# Patient Record
Sex: MALE | Race: WHITE | NOT HISPANIC OR LATINO
[De-identification: names, ages, dates, MRNs, and addresses within clinical notes are randomized per-mention and may not be internally consistent; named-entity substitution may affect disease eponyms.]

---

## 2019-04-02 PROBLEM — Z00.00 ENCOUNTER FOR PREVENTIVE HEALTH EXAMINATION: Status: ACTIVE | Noted: 2019-04-02

## 2019-04-03 ENCOUNTER — APPOINTMENT (OUTPATIENT)
Dept: PHYSICAL MEDICINE AND REHAB | Facility: CLINIC | Age: 39
End: 2019-04-03
Payer: COMMERCIAL

## 2019-04-03 ENCOUNTER — RECORD ABSTRACTING (OUTPATIENT)
Age: 39
End: 2019-04-03

## 2019-04-03 VITALS — BODY MASS INDEX: 23.59 KG/M2 | HEIGHT: 73 IN | WEIGHT: 178 LBS

## 2019-04-03 DIAGNOSIS — M19.019 PRIMARY OSTEOARTHRITIS, UNSPECIFIED SHOULDER: ICD-10-CM

## 2019-04-03 DIAGNOSIS — S43.419A: ICD-10-CM

## 2019-04-03 DIAGNOSIS — M54.2 CERVICALGIA: ICD-10-CM

## 2019-04-03 DIAGNOSIS — S43.50XA SPRAIN OF UNSPECIFIED ACROMIOCLAVICULAR JOINT, INITIAL ENCOUNTER: ICD-10-CM

## 2019-04-03 DIAGNOSIS — Z82.61 FAMILY HISTORY OF ARTHRITIS: ICD-10-CM

## 2019-04-03 DIAGNOSIS — M23.305 OTHER MENISCUS DERANGEMENTS, UNSPECIFIED MEDIAL MENISCUS, UNSPECIFIED KNEE: ICD-10-CM

## 2019-04-03 PROCEDURE — 99204 OFFICE O/P NEW MOD 45 MIN: CPT | Mod: 25

## 2019-04-03 PROCEDURE — 20552 NJX 1/MLT TRIGGER POINT 1/2: CPT

## 2019-04-03 RX ORDER — EZETIMIBE 10 MG/1
TABLET ORAL
Refills: 0 | Status: ACTIVE | COMMUNITY

## 2019-04-03 RX ORDER — CYCLOBENZAPRINE HYDROCHLORIDE 10 MG/1
10 TABLET, FILM COATED ORAL
Refills: 0 | Status: DISCONTINUED | COMMUNITY
End: 2019-04-03

## 2019-04-03 RX ORDER — DICLOFENAC SODIUM 75 MG/1
75 TABLET, DELAYED RELEASE ORAL TWICE DAILY
Qty: 60 | Refills: 0 | Status: ACTIVE | COMMUNITY
Start: 2019-04-03 | End: 1900-01-01

## 2019-04-03 RX ORDER — ROSUVASTATIN CALCIUM 5 MG/1
TABLET, FILM COATED ORAL
Refills: 0 | Status: ACTIVE | COMMUNITY

## 2019-04-03 RX ORDER — MELOXICAM 15 MG/1
15 TABLET ORAL
Refills: 0 | Status: DISCONTINUED | COMMUNITY
End: 2019-04-03

## 2019-04-03 RX ORDER — IBUPROFEN 200 MG/1
TABLET, COATED ORAL
Refills: 0 | Status: ACTIVE | COMMUNITY

## 2019-04-03 NOTE — HISTORY OF PRESENT ILLNESS
[FreeTextEntry1] : Location: left neck\par Quality: sharp\par Severity: moderate\par Duration: 1 yr\par Timing: chronic\par Context: working out; lifting weight from rack \par Aggravating Factors: lifting weights\par Alleviating Factors: rest\par Associated Symptoms: denies weight loss, fever, chills, change in bowel/bladder habits, redness, warmth, weakness, numbness/tingling, radiation down \par Prior Studies:\par

## 2019-04-03 NOTE — ASSESSMENT
[FreeTextEntry1] : NO heavy lifting for 2-3 wk.  Stretch neck more.  Roll out upper back against wall.  He had to leave soon so did not get xrays.

## 2019-04-03 NOTE — PROCEDURE
[de-identified] : After informed consent,he elected to proceed with a trigger point injection into the left trapezius. I confirmed no prior adverse reactions, no active infections, and no relevant allergies. \par  \par The skin was prepped in the usual sterile manner. The muscles were pinched and elevated from the chest wall to avoid puncturing the lung. The sites were injected with local anesthetic followed by local needling. The injection was completed without complication and a bandage was applied. He tolerated the procedure well and was given post-injection instructions. \par  \par Cold Tx x 48 hours, analgesics prn. Medications: 0.5 ml of 1% Lidocaine per site\par \par

## 2019-04-03 NOTE — PHYSICAL EXAM
[FreeTextEntry1] : PERLA is a 39 year  yr old male \par \par Constitutional: healthy appearing, NAD, and normal body habitus\par \par NECK\par ROM: flexion to 30, ext to 30, rotation to 70 deg bilat\par \par Inspection: no erythema, warmth\par Spine: no TTP in spinous process\par Soft tissue palpation: no TTP in cervical paraspinals, rhomboids,TTP in left trapezius\par \par 5/5 bilateral elb flex/ext, WE, finger abd/flex\par sensation intact in bilat UE\par reflexes:  biceps and triceps 1+ bilat\par \par Special tests: neg Spurling, Strauss\par \par

## 2019-04-04 ENCOUNTER — APPOINTMENT (OUTPATIENT)
Dept: PHYSICAL MEDICINE AND REHAB | Facility: CLINIC | Age: 39
End: 2019-04-04
Payer: COMMERCIAL

## 2019-04-04 VITALS — WEIGHT: 178 LBS | BODY MASS INDEX: 23.59 KG/M2 | HEIGHT: 73 IN

## 2019-04-04 DIAGNOSIS — Z80.9 FAMILY HISTORY OF MALIGNANT NEOPLASM, UNSPECIFIED: ICD-10-CM

## 2019-04-04 PROCEDURE — 72040 X-RAY EXAM NECK SPINE 2-3 VW: CPT

## 2019-04-04 PROCEDURE — 99214 OFFICE O/P EST MOD 30 MIN: CPT | Mod: 25

## 2019-04-04 PROCEDURE — 20552 NJX 1/MLT TRIGGER POINT 1/2: CPT

## 2019-04-04 NOTE — HISTORY OF PRESENT ILLNESS
[FreeTextEntry1] : Location: left neck\par Quality: sharp\par Severity: moderate\par Duration: 1 yr\par Timing: chronic\par Context: working out; lifting weight from rack \par Aggravating Factors: lifting weights\par Alleviating Factors: rest\par Associated Symptoms: denies weight loss, fever, chills, change in bowel/bladder habits, redness, warmth, weakness, numbness/tingling, radiation down \par Prior Studies: none\par

## 2019-04-04 NOTE — PROCEDURE
[de-identified] : \par \par \par 0.2 x 30 mm sterile solid steel needles were inserted into Du 14, and left trapezius in multiple areas and manipulated intermittently over the following 15 minutes. The needles were then removed. Hemostasis was achieved immediately. He  tolerated the procedure well and was given discharge instructions and then discharged to home without any complaints or complications.\par \par

## 2019-04-04 NOTE — PHYSICAL EXAM
[FreeTextEntry1] : PERLA is a 39 year yr old male \par \par Constitutional: healthy appearing, NAD, and normal body habitus\par \par NECK\par ROM: flexion to 30, ext to 30, rotation to 70 deg bilat\par \par Inspection: no erythema, warmth\par Spine: no TTP in spinous process\par Soft tissue palpation: no TTP in cervical paraspinals, rhomboids,TTP in left trapezius\par \par 5/5 bilateral elb flex/ext, WE, finger abd/flex\par sensation intact in bilat UE\par reflexes: biceps and triceps 1+ bilat\par \par Special tests: neg Spurling, Strauss\par

## 2019-04-04 NOTE — ASSESSMENT
[FreeTextEntry1] : Adjust laptop setup.  Limit cell phone use and raise it to eye level when using it.

## 2019-04-09 ENCOUNTER — APPOINTMENT (OUTPATIENT)
Dept: PHYSICAL MEDICINE AND REHAB | Facility: CLINIC | Age: 39
End: 2019-04-09
Payer: COMMERCIAL

## 2019-04-09 VITALS — BODY MASS INDEX: 23.59 KG/M2 | HEIGHT: 73 IN | WEIGHT: 178 LBS

## 2019-04-09 PROCEDURE — 20552 NJX 1/MLT TRIGGER POINT 1/2: CPT

## 2019-04-09 PROCEDURE — 99213 OFFICE O/P EST LOW 20 MIN: CPT | Mod: 25

## 2019-04-09 NOTE — PHYSICAL EXAM
[FreeTextEntry1] : PERLA is a 39 year yr old male \par \par Constitutional: healthy appearing, NAD, and normal body habitus\par \par NECK\par ROM: flexion to 30, ext to 30, rotation to 70 deg bilat\par \par Inspection: no erythema, warmth\par Spine: no TTP in spinous process\par Soft tissue palpation: no TTP in cervical paraspinals, rhomboids,TTP in left trapezius\par \par 5/5 bilateral elb flex/ext, WE, finger abd/flex\par sensation intact in bilat UE\par reflexes: biceps and triceps 1+ bilat\par \par Special tests: neg Spurling, Strauss\par . \par

## 2019-04-09 NOTE — PROCEDURE
[de-identified] : \par \par \par \par 0.2 x 30 mm sterile solid steel needles were inserted into Du 14, and left trapezius in multiple areas and manipulated intermittently over the following 15 minutes. The needles were then removed. Hemostasis was achieved immediately. He  tolerated the procedure well and was given discharge instructions and then discharged to home without any complaints or complications.\par \par

## 2019-04-09 NOTE — HISTORY OF PRESENT ILLNESS
[FreeTextEntry1] : Location: left neck\par Quality: sharp\par Severity: mild, improving\par Duration: 1 yr\par Timing: chronic\par Context: working out; lifting weight from rack \par Aggravating Factors: lifting weights\par Alleviating Factors: rest\par Associated Symptoms: denies weight loss, fever, chills, change in bowel/bladder habits, redness, warmth, weakness, numbness/tingling, radiation down \par Prior Studies: xray\par

## 2019-04-16 ENCOUNTER — APPOINTMENT (OUTPATIENT)
Dept: PHYSICAL MEDICINE AND REHAB | Facility: CLINIC | Age: 39
End: 2019-04-16
Payer: COMMERCIAL

## 2019-04-16 PROCEDURE — 99213 OFFICE O/P EST LOW 20 MIN: CPT | Mod: 25

## 2019-04-16 PROCEDURE — 20552 NJX 1/MLT TRIGGER POINT 1/2: CPT

## 2019-04-16 NOTE — PHYSICAL EXAM
[FreeTextEntry1] : PERLA is a 39 year yr old male \par \par Constitutional: healthy appearing, NAD, and normal body habitus\par \par NECK\par ROM: flexion to 30, ext to 30, rotation to 70 deg bilat\par \par Inspection: no erythema, warmth\par Spine: no TTP in spinous process\par Soft tissue palpation: no TTP in cervical paraspinals, rhomboids,TTP in left trapezius\par \par 5/5 bilateral elb flex/ext, WE, finger abd/flex\par sensation intact in bilat UE\par reflexes: biceps and triceps 1+ bilat\par \par \par

## 2019-04-16 NOTE — HISTORY OF PRESENT ILLNESS
[FreeTextEntry1] : Location: left neck\par Quality: sharp\par Severity: mild, improving\par Duration: 1 yr\par Timing: chronic\par Context: working out; lifting weight from rack \par Aggravating Factors: lifting weights\par Alleviating Factors: rest, TP tx\par Associated Symptoms: denies weight loss, fever, chills, change in bowel/bladder habits, redness, warmth, weakness, numbness/tingling, radiation down \par Prior Studies: xray\par

## 2019-04-16 NOTE — PROCEDURE
[de-identified] : \par \par \par \par 0.2 x 30 mm sterile solid steel needles were inserted into Du 14, and left trapezius in multiple areas and manipulated intermittently over the following 15 minutes. The needles were then removed. Hemostasis was achieved immediately. He  tolerated the procedure well and was given discharge instructions and then discharged to home without any complaints or complications.\par \par

## 2019-04-24 ENCOUNTER — APPOINTMENT (OUTPATIENT)
Dept: PHYSICAL MEDICINE AND REHAB | Facility: CLINIC | Age: 39
End: 2019-04-24
Payer: COMMERCIAL

## 2019-04-24 VITALS — HEIGHT: 73 IN | BODY MASS INDEX: 23.59 KG/M2 | WEIGHT: 178 LBS

## 2019-04-24 PROCEDURE — 99213 OFFICE O/P EST LOW 20 MIN: CPT | Mod: 25

## 2019-04-24 PROCEDURE — 20552 NJX 1/MLT TRIGGER POINT 1/2: CPT

## 2019-04-24 NOTE — PROCEDURE
[de-identified] : \par \par \par 0.2 x 30 mm sterile solid steel needles were inserted into  Du 14, and left trapezius in multiple areas and manipulated intermittently over the following 15 minutes. The needles were then removed. Hemostasis was achieved immediately. He  tolerated the procedure well and was given discharge instructions and then discharged to home without any complaints or complications.\par \par

## 2019-04-24 NOTE — PHYSICAL EXAM
[FreeTextEntry1] : PERLA is a 39 year yr old male \par \par Constitutional: healthy appearing, NAD, and normal body habitus\par \par NECK\par ROM: flexion to 30, ext to 30, rotation to 70 deg bilat\par \par Inspection: no erythema, warmth\par Spine: no TTP in spinous process\par Soft tissue palpation: no TTP in cervical paraspinals, rhomboids, mild TTP in left trapezius\par \par 5/5 bilateral elb flex/ext, WE, finger abd/flex\par sensation intact in bilat UE\par reflexes: biceps and triceps 1+ bilat\par \par \par

## 2019-04-30 ENCOUNTER — APPOINTMENT (OUTPATIENT)
Dept: PHYSICAL MEDICINE AND REHAB | Facility: CLINIC | Age: 39
End: 2019-04-30
Payer: COMMERCIAL

## 2019-04-30 VITALS — WEIGHT: 178 LBS | HEIGHT: 73 IN | BODY MASS INDEX: 23.59 KG/M2

## 2019-04-30 PROCEDURE — 20552 NJX 1/MLT TRIGGER POINT 1/2: CPT

## 2019-04-30 PROCEDURE — 99213 OFFICE O/P EST LOW 20 MIN: CPT | Mod: 25

## 2019-04-30 NOTE — ASSESSMENT
[FreeTextEntry1] : MRI is medically necessary to further evaluation the condition.  \par \par He  has recently tried the following treatments:\par Activity modification      +\par Ice/Compression           +\par Nsaids                           +\par Home exercise             +\par Trigger point treatment  +\par \par Try ma roller. \par

## 2019-04-30 NOTE — PROCEDURE
[de-identified] : \par 0.2 x 30 mm sterile solid steel needles were inserted into Du 14, and cervical paraspinals bilaterally and manipulated intermittently over the following 15 minutes. The needles were then removed. Hemostasis was achieved immediately. He  tolerated the procedure well and was given discharge instructions and then discharged to home without any complaints or complications.\par \par

## 2019-04-30 NOTE — PHYSICAL EXAM
[FreeTextEntry1] : PERLA is a 39 year yr old male \par \par Constitutional: healthy appearing, NAD, and normal body habitus\par \par NECK\par ROM: flexion to 30, ext to 30, rotation to 80 deg bilat\par \par Inspection: no erythema, warmth\par Spine: no TTP in spinous process\par Soft tissue palpation:  TTP in cervical paraspinals, no TTP in rhomboids, mild TTP in left trapezius\par \par 5/5 bilateral elb flex/ext, WE, finger abd/flex\par sensation intact in bilat UE\par reflexes: biceps and triceps 1+ bilat\par \par

## 2019-04-30 NOTE — HISTORY OF PRESENT ILLNESS
[FreeTextEntry1] : Location: neck\par Quality: sharp\par Severity: moderate, worse after doing side to side pullups\par Duration: 1 yr\par Timing: chronic\par Context: working out; lifting weight from rack \par Aggravating Factors: lifting weights\par Alleviating Factors: rest, TP tx\par Associated Symptoms: denies weight loss, fever, chills, change in bowel/bladder habits, redness, warmth, weakness, numbness/tingling, radiation down \par Prior Studies: xray\par

## 2019-05-07 ENCOUNTER — APPOINTMENT (OUTPATIENT)
Dept: PHYSICAL MEDICINE AND REHAB | Facility: CLINIC | Age: 39
End: 2019-05-07
Payer: COMMERCIAL

## 2019-05-07 VITALS — WEIGHT: 178 LBS | BODY MASS INDEX: 23.59 KG/M2 | HEIGHT: 73 IN

## 2019-05-07 VITALS — BODY MASS INDEX: 23.99 KG/M2 | HEIGHT: 73 IN | WEIGHT: 181 LBS

## 2019-05-07 PROCEDURE — 99213 OFFICE O/P EST LOW 20 MIN: CPT | Mod: 25

## 2019-05-07 PROCEDURE — 20552 NJX 1/MLT TRIGGER POINT 1/2: CPT

## 2019-05-07 RX ORDER — MELOXICAM 7.5 MG/1
7.5 TABLET ORAL TWICE DAILY
Qty: 60 | Refills: 0 | Status: DISCONTINUED | COMMUNITY
Start: 2019-04-04 | End: 2019-05-07

## 2019-05-07 NOTE — HISTORY OF PRESENT ILLNESS
[FreeTextEntry1] : Location: neck\par Quality: sharp\par Severity: mild\par Duration: 1 yr\par Timing: chronic\par Context: working out; lifting weight from rack \par Aggravating Factors: lifting weights\par Alleviating Factors: rest, TP tx\par Associated Symptoms: denies weight loss, fever, chills, change in bowel/bladder habits, redness, warmth, weakness, numbness/tingling, radiation down \par Prior Studies: xray\par

## 2019-05-07 NOTE — PROCEDURE
[de-identified] : \par \par 0.2 x 30 mm sterile solid steel needles were inserted into  Du 14, and trapezius, cervical paraspinals bilaterally and manipulated intermittently over the following 15 minutes. The needles were then removed. Hemostasis was achieved immediately. He  tolerated the procedure well and was given discharge instructions and then discharged to home without any complaints or complications.\par \par

## 2019-05-07 NOTE — PHYSICAL EXAM
[FreeTextEntry1] : PERLA is a 39 year yr old male \par \par Constitutional: healthy appearing, NAD, and normal body habitus\par \par NECK\par ROM: flexion to 30, ext to 30, rotation to 80 deg bilat\par \par Inspection: no erythema, warmth\par Spine: no TTP in spinous process\par Soft tissue palpation: TTP in cervical paraspinals, no TTP in rhomboids, mild TTP in left trapezius\par \par 5/5 bilateral elb flex/ext, WE, finger abd/flex\par sensation intact in bilat UE\par reflexes: biceps and triceps 1+ bilat\par \par . \par

## 2019-05-14 ENCOUNTER — APPOINTMENT (OUTPATIENT)
Dept: PHYSICAL MEDICINE AND REHAB | Facility: CLINIC | Age: 39
End: 2019-05-14
Payer: COMMERCIAL

## 2019-05-14 VITALS — HEIGHT: 73 IN | WEIGHT: 181 LBS | BODY MASS INDEX: 23.99 KG/M2

## 2019-05-14 PROCEDURE — 99213 OFFICE O/P EST LOW 20 MIN: CPT | Mod: 25

## 2019-05-14 PROCEDURE — 20552 NJX 1/MLT TRIGGER POINT 1/2: CPT

## 2019-05-14 NOTE — PROCEDURE
[de-identified] : \par \par 0.2 x 30 mm sterile solid steel needles were inserted into Ub 25 to 26 bilateral  Du 14, and left trapezius and manipulated intermittently over the following 15 minutes. The needles were then removed. Hemostasis was achieved immediately. He  tolerated the procedure well and was given discharge instructions and then discharged to home without any complaints or complications.\par \par

## 2019-05-14 NOTE — HISTORY OF PRESENT ILLNESS
[FreeTextEntry1] : Location: neck\par Quality: sharp\par Severity: no pain in past day\par Duration: 1 yr\par Timing: chronic\par Context: working out; lifting weight from rack \par Aggravating Factors: lifting weights\par Alleviating Factors: rest, TP tx\par Associated Symptoms: denies weight loss, fever, chills, change in bowel/bladder habits, redness, warmth, weakness, numbness/tingling, radiation down \par Prior Studies: xray, MRI\par \par Low back hurting after helping his dad set up new bed.  NO pain down legs, numbness.

## 2019-05-14 NOTE — PHYSICAL EXAM
[FreeTextEntry1] : PERLA is a 39 year yr old male \par \par Constitutional: healthy appearing, NAD, and normal body habitus\par \par NECK\par ROM: flexion to 30, ext to 30, rotation to 80 deg bilat\par \par Inspection: no erythema, warmth\par Spine: no TTP in spinous process\par Soft tissue palpation: TTP in cervical paraspinals, no TTP in rhomboids, mild TTP in left trapezius\par \par 5/5 bilateral elb flex/ext, WE, finger abd/flex\par sensation intact in bilat UE\par reflexes: biceps and triceps 1+ bilat\par \par \par \par LUMBAR\par ROM: flexion to 30 deg, ext normal\par \par Gait: normal\par \par Inspection: no erythema, warmth\par Spine: no TTP in spinous process, SI joint, sacrum\par \par Soft tissue palpation hip: no TTP in gluteus kasandra\par Soft tissue palpation of spine: no TTP in lumbar paraspinals\par \par 5/5 bilateral HF, KE, DF, PF \par sensation intact in bilat LE\par \par \par \par \par

## 2020-01-13 ENCOUNTER — APPOINTMENT (OUTPATIENT)
Dept: PHYSICAL MEDICINE AND REHAB | Facility: CLINIC | Age: 40
End: 2020-01-13
Payer: COMMERCIAL

## 2020-01-13 PROCEDURE — 20552 NJX 1/MLT TRIGGER POINT 1/2: CPT

## 2020-01-13 PROCEDURE — 99214 OFFICE O/P EST MOD 30 MIN: CPT | Mod: 25

## 2020-01-13 RX ORDER — KETOCONAZOLE 20 MG/G
2 CREAM TOPICAL
Qty: 30 | Refills: 0 | Status: ACTIVE | COMMUNITY
Start: 2019-08-29

## 2020-01-13 RX ORDER — HYDROCORTISONE ACETATE, PRAMOXINE HCL 2.5; 1 G/100G; G/100G
2.5-1 CREAM TOPICAL
Qty: 28 | Refills: 0 | Status: ACTIVE | COMMUNITY
Start: 2019-08-29

## 2020-01-13 RX ORDER — ROSUVASTATIN CALCIUM 20 MG/1
20 TABLET, FILM COATED ORAL
Qty: 90 | Refills: 0 | Status: ACTIVE | COMMUNITY
Start: 2019-06-12

## 2020-01-13 NOTE — ASSESSMENT
[FreeTextEntry1] : Roll out back with tennis ball or massage ball instead of foam roller.  Taught back stretch.  Lift things properly.  Continue HEP.  Can try sleeping on floor if he wants a firmer surface.

## 2020-01-13 NOTE — PROCEDURE
[de-identified] : \par Trigger Point Tx\par After cleaning with alcohol, sterile needles were inserted into lower thoracic and lumbar paraspinals bilaterally and manipulated intermittently followed by injection of Lidocaine. The needles were then removed. Hemostasis was achieved immediately. He  tolerated the procedure well and was given discharge instructions and then discharged to home without any complaints or complications.\par \par

## 2020-01-13 NOTE — HISTORY OF PRESENT ILLNESS
[FreeTextEntry1] : Location: back\par Quality: sharp\par Severity: moderate\par Duration: few months\par Timing: chronic\par Context: lifting bed\par Aggravating Factors: lifting, bending\par Alleviating Factors: rest\par Associated Symptoms: denies weight loss, fever, chills, change in bowel/bladder habits, redness, warmth, weakness, numbness/tingling, +radiation down legs\par Prior Studies:\par

## 2020-01-13 NOTE — PHYSICAL EXAM
[FreeTextEntry1] : PERLA is a 39 year yr old male \par \par Constitutional: healthy appearing, NAD, and normal body habitus\par \par LUMBAR\par ROM: flexion to 30 deg, ext normal\par \par Gait: normal\par \par Inspection: no erythema, warmth\par Spine: no TTP in spinous process, SI joint, sacrum\par \par Soft tissue palpation hip: no TTP in gluteus kasandra\par Soft tissue palpation of spine: no TTP in lumbar paraspinals\par \par 5/5 bilateral HF, KE, DF, PF \par sensation intact in bilat LE\par \par

## 2020-01-21 ENCOUNTER — APPOINTMENT (OUTPATIENT)
Dept: PHYSICAL MEDICINE AND REHAB | Facility: CLINIC | Age: 40
End: 2020-01-21
Payer: COMMERCIAL

## 2020-01-21 PROCEDURE — 99213 OFFICE O/P EST LOW 20 MIN: CPT | Mod: 25

## 2020-01-21 PROCEDURE — 20552 NJX 1/MLT TRIGGER POINT 1/2: CPT

## 2020-01-21 NOTE — PHYSICAL EXAM
[FreeTextEntry1] : PERLA is a 39 year yr old male \par \par Constitutional: healthy appearing, NAD, and normal body habitus\par \par LUMBAR\par ROM: flexion to 30 deg, ext normal\par \par Gait: normal\par \par Inspection: no erythema, warmth\par Spine: no TTP in spinous process, SI joint, sacrum\par \par Soft tissue palpation hip: no TTP in gluteus kasandra\par Soft tissue palpation of spine: no TTP in lumbar paraspinals\par \par 5/5 bilateral HF, KE, DF, PF \par sensation intact in bilat LE\par \par \par 5/5 bilat UE\par sensation intact UE

## 2020-01-21 NOTE — PROCEDURE
[de-identified] : \par Trigger Point Tx\par After cleaning with alcohol, sterile needles were inserted into lumbar paraspinals bilaterally and manipulated intermittently followed by injection of Lidocaine. The needles were then removed. Hemostasis was achieved immediately. He  tolerated the procedure well and was given discharge instructions and then discharged to home without any complaints or complications.\par \par

## 2020-01-21 NOTE — HISTORY OF PRESENT ILLNESS
[FreeTextEntry1] : Location: back\par Quality: sharp\par Severity: moderate, improving\par Duration: few months\par Timing: chronic\par Context: lifting bed\par Aggravating Factors: lifting, bending\par Alleviating Factors: rest, TP tx\par Associated Symptoms: denies weight loss, fever, chills, change in bowel/bladder habits, redness, warmth, weakness, numbness/tingling, +radiation down legs\par Prior Studies:\par \par Location: left neck\par Quality: sharp\par Severity: mild\par Duration: over 1 yr\par Timing: chronic\par Context: working out; lifting weight from rack \par Aggravating Factors: lifting weights\par Alleviating Factors: rest\par Associated Symptoms: denies weight loss, fever, chills, change in bowel/bladder habits, redness, warmth, weakness, numbness/tingling, radiation down \par Prior Studies: MRI May 2019\par \par

## 2020-01-28 ENCOUNTER — APPOINTMENT (OUTPATIENT)
Dept: PHYSICAL MEDICINE AND REHAB | Facility: CLINIC | Age: 40
End: 2020-01-28

## 2020-03-03 ENCOUNTER — APPOINTMENT (OUTPATIENT)
Dept: PHYSICAL MEDICINE AND REHAB | Facility: CLINIC | Age: 40
End: 2020-03-03
Payer: COMMERCIAL

## 2020-03-03 PROCEDURE — 99213 OFFICE O/P EST LOW 20 MIN: CPT | Mod: 25

## 2020-03-03 PROCEDURE — 20552 NJX 1/MLT TRIGGER POINT 1/2: CPT

## 2020-03-03 NOTE — HISTORY OF PRESENT ILLNESS
[FreeTextEntry1] : Location: back\par Quality: sharp\par Severity: moderate\par Duration: few months\par Timing: chronic, hx LBP for years\par Context: lifting bed\par Aggravating Factors: lifting, bending\par Alleviating Factors: rest, TP tx\par Associated Symptoms: denies weight loss, fever, chills, change in bowel/bladder habits, redness, warmth, weakness, numbness/tingling, +radiation down legs\par Prior Studies: MRI years ago

## 2020-03-03 NOTE — PROCEDURE
[de-identified] : \par Trigger Point Tx\par After cleaning with alcohol, sterile needles were inserted into Ub 25 to 26, rhomboids bilaterally and manipulated intermittently followed by injection of Lidocaine. The needles were then removed. Hemostasis was achieved immediately. He  tolerated the procedure well and was given discharge instructions and then discharged to home without any complaints or complications.\par \par

## 2020-03-11 ENCOUNTER — APPOINTMENT (OUTPATIENT)
Dept: PHYSICAL MEDICINE AND REHAB | Facility: CLINIC | Age: 40
End: 2020-03-11

## 2020-08-21 ENCOUNTER — APPOINTMENT (OUTPATIENT)
Dept: PHYSICAL MEDICINE AND REHAB | Facility: CLINIC | Age: 40
End: 2020-08-21
Payer: COMMERCIAL

## 2020-08-21 VITALS — TEMPERATURE: 94.9 F

## 2020-08-21 DIAGNOSIS — M23.304 OTHER MENISCUS DERANGEMENTS, UNSPECIFIED MEDIAL MENISCUS, LEFT KNEE: ICD-10-CM

## 2020-08-21 PROCEDURE — 99214 OFFICE O/P EST MOD 30 MIN: CPT

## 2020-08-21 PROCEDURE — 73562 X-RAY EXAM OF KNEE 3: CPT | Mod: LT

## 2020-08-21 NOTE — PHYSICAL EXAM
[FreeTextEntry1] : PERLA is a 40 year yr old male \par \par Constitutional: healthy appearing, NAD, and normal body habitus\par \par LUMBAR\par ROM: flexion to 30 deg, ext normal\par \par Gait: normal\par \par Inspection: no erythema, warmth\par Spine: no TTP in spinous process, SI joint, sacrum\par \par Soft tissue palpation hip: no TTP in gluteus kasandra\par Soft tissue palpation of spine: no TTP in lumbar paraspinals\par \par 5/5 bilateral HF, KE, DF, PF \par sensation intact in bilat LE\par \par left KNEE:\par flexion to 120 deg, ext normal\par no swelling, erythema, warmth\par +[facility], +linda\par no TTP in medial joint

## 2020-08-21 NOTE — ASSESSMENT
[FreeTextEntry1] : Try Pennsaid.  Ice area often.  Corrected lunge form.  Taught hip abd.  Learn ITB stretch. No running for now. \par \par f/u after MRI.

## 2020-08-21 NOTE — HISTORY OF PRESENT ILLNESS
[FreeTextEntry1] : Location: back\par Quality: sharp\par Severity: moderate\par Duration: over 1 yr\par Timing: chronic, hx LBP for years\par Context: lifting bed\par Aggravating Factors: lifting, bending\par Alleviating Factors: rest, TP tx\par Associated Symptoms: denies weight loss, fever, chills, change in bowel/bladder habits, redness, warmth, weakness, numbness/tingling, +radiation down legs sometimes\par Prior Studies: MRI years ago \par \par Left knee pain started 1 month ago. He has been running a lot on concrete.

## 2020-08-21 NOTE — DATA REVIEWED
[FreeTextEntry1] : In the office x-rays of the left knee AP lateral and sunrise show mildly laterally tilted patella [Plain X-Rays] : plain X-Rays

## 2020-09-02 ENCOUNTER — APPOINTMENT (OUTPATIENT)
Dept: PHYSICAL MEDICINE AND REHAB | Facility: CLINIC | Age: 40
End: 2020-09-02
Payer: COMMERCIAL

## 2020-09-02 PROCEDURE — 99214 OFFICE O/P EST MOD 30 MIN: CPT | Mod: 25

## 2020-09-02 PROCEDURE — 20552 NJX 1/MLT TRIGGER POINT 1/2: CPT

## 2020-09-02 RX ORDER — BACLOFEN 5 MG/1
5 TABLET ORAL
Qty: 50 | Refills: 0 | Status: ACTIVE | COMMUNITY
Start: 2020-09-02 | End: 1900-01-01

## 2020-09-02 NOTE — PROCEDURE
[de-identified] : \par Trigger Point Tx\par After cleaning with alcohol, sterile needles were inserted into Ub 25 to 26, Du 14, cervical paraspinals bilaterally and manipulated intermittently followed by injection of Lidocaine. The needles were then removed. Hemostasis was achieved immediately. He  tolerated the procedure well and was given discharge instructions and then discharged to home without any complaints or complications.\par \par

## 2020-09-02 NOTE — HISTORY OF PRESENT ILLNESS
[FreeTextEntry1] : Location: back\par Quality: tightness\par Severity: 7/10\par Duration: over 1 yr\par Timing: chronic, hx LBP for years\par Context: lifting bed; dad has leukemia\par Aggravating Factors: lifting, bending\par Alleviating Factors: rest, TP tx\par Associated Symptoms: denies weight loss, fever, chills, change in bowel/bladder habits, redness, warmth, weakness, numbness/tingling, +radiation down legs sometimes\par Prior Studies: MRI 2020 \par \par Location:  neck\par Quality: sharp\par Severity: moderate\par Duration: over 1 yr\par Timing: chronic\par Context: working out; lifting weight from rack \par Aggravating Factors: lifting weights\par Alleviating Factors: rest\par Associated Symptoms: denies weight loss, fever, chills, change in bowel/bladder habits, redness, warmth, weakness, +numbness/tingling in arm improved, radiation down arm\par Prior Studies: MRI May 2019\par \par

## 2020-09-02 NOTE — ASSESSMENT
[FreeTextEntry1] : Try ma roller.  Avoid sleeping on arm.  He did not get CTS splint yet.  Will get EMG if does not improve in few wks. \par \par f/u 3-4 wk.

## 2020-09-02 NOTE — PHYSICAL EXAM
[FreeTextEntry1] : PERLA is a 40 year yr old male \par \par Constitutional: healthy appearing, NAD, and normal body habitus\par \par LUMBAR\par ROM: flexion to 30 deg, ext normal\par \par Gait: normal\par \par Inspection: no erythema, warmth\par Spine: no TTP in spinous process, SI joint, sacrum\par \par Soft tissue palpation hip: no TTP in gluteus kasandra\par Soft tissue palpation of spine: no TTP in lumbar paraspinals\par \par 5/5 bilateral HF, KE, DF, PF \par sensation intact in bilat LE\par \par \par 5/5 bilat UE\par sensation intact in bilat UE

## 2020-09-09 ENCOUNTER — APPOINTMENT (OUTPATIENT)
Dept: PHYSICAL MEDICINE AND REHAB | Facility: CLINIC | Age: 40
End: 2020-09-09

## 2020-10-07 ENCOUNTER — APPOINTMENT (OUTPATIENT)
Dept: PHYSICAL MEDICINE AND REHAB | Facility: CLINIC | Age: 40
End: 2020-10-07
Payer: COMMERCIAL

## 2020-10-07 PROCEDURE — 20552 NJX 1/MLT TRIGGER POINT 1/2: CPT

## 2020-10-07 PROCEDURE — 99214 OFFICE O/P EST MOD 30 MIN: CPT | Mod: 25

## 2020-10-07 RX ORDER — MELOXICAM 7.5 MG/1
7.5 TABLET ORAL TWICE DAILY
Qty: 60 | Refills: 0 | Status: ACTIVE | COMMUNITY
Start: 2019-04-04 | End: 1900-01-01

## 2020-10-07 NOTE — PHYSICAL EXAM
[FreeTextEntry1] : PERLA is a 40 year yr old male \par \par Constitutional: healthy appearing, NAD, and normal body habitus\par \par LUMBAR\par ROM: flexion to 30 deg, ext normal\par \par Gait: normal\par \par Inspection: no erythema, warmth\par Spine: no TTP in spinous process, SI joint, sacrum\par \par Soft tissue palpation hip: no TTP in gluteus kasandra\par Soft tissue palpation of spine: no TTP in lumbar paraspinals\par \par 5/5 bilateral HF, KE, DF, PF \par sensation intact in bilat LE\par \par NECK:\par rotation to 45 deg bilat\par 5/5 bilat UE\par sensation intact in bilat UE\par TTP in right trapezius

## 2020-10-07 NOTE — HISTORY OF PRESENT ILLNESS
[FreeTextEntry1] : Location: back\par Quality: tightness\par Severity: mild\par Duration: over 1 yr\par Timing: chronic, hx LBP for years\par Context: lifting bed; dad has leukemia\par Aggravating Factors: lifting, bending\par Alleviating Factors: rest, TP tx\par Associated Symptoms: denies weight loss, fever, chills, change in bowel/bladder habits, redness, warmth, weakness, numbness/tingling, +radiation down legs sometimes\par Prior Studies: MRI 2020 \par \par Location: neck\par Quality: sharp\par Severity: 9/10, worse after starting boxing again\par Duration: over 1 yr\par Timing: chronic\par Context: working out; lifting weight from rack \par Aggravating Factors: lifting weights\par Alleviating Factors: rest\par Associated Symptoms: denies weight loss, fever, chills, change in bowel/bladder habits, redness, warmth, weakness, +numbness/tingling in arm improved, radiation down arm\par Prior Studies: MRI May 2019\par

## 2020-10-07 NOTE — PROCEDURE
[de-identified] : \par Trigger Point Tx\par After cleaning with alcohol, sterile needles were inserted into Ub 25 to 26 bilaterally, Du 14, and right trapezius in multiple spots and manipulated intermittently followed by injection of Lidocaine. The needles were then removed. Hemostasis was achieved immediately. He  tolerated the procedure well and was given discharge instructions and then discharged to home without any complaints or complications.\par \par

## 2020-10-14 ENCOUNTER — APPOINTMENT (OUTPATIENT)
Dept: PHYSICAL MEDICINE AND REHAB | Facility: CLINIC | Age: 40
End: 2020-10-14
Payer: COMMERCIAL

## 2020-10-14 VITALS — TEMPERATURE: 97.5 F

## 2020-10-14 PROCEDURE — 99213 OFFICE O/P EST LOW 20 MIN: CPT | Mod: 25

## 2020-10-14 PROCEDURE — 20552 NJX 1/MLT TRIGGER POINT 1/2: CPT

## 2020-10-15 NOTE — PHYSICAL EXAM
[FreeTextEntry1] : PERLA is a 40 year yr old male \par \par Constitutional: healthy appearing, NAD, and normal body habitus\par \par LUMBAR\par ROM: flexion to 30 deg, ext normal\par \par Gait: normal\par \par Inspection: no erythema, warmth\par Spine: no TTP in spinous process, SI joint, sacrum\par \par Soft tissue palpation hip: no TTP in gluteus kasandra\par Soft tissue palpation of spine: no TTP in lumbar paraspinals\par \par 5/5 bilateral HF, KE, DF, PF \par sensation intact in bilat LE\par \par NECK:\par rotation to 60 deg bilat\par 5/5 bilat UE\par sensation intact in bilat UE\par TTP in right trapezius

## 2020-10-15 NOTE — ASSESSMENT
[FreeTextEntry1] : Do neck and upper back exercises gently.  \par \par Had long discussion about how muscles require specific stimulus to get better.  For example, cycling does not translate to running.

## 2020-10-15 NOTE — PROCEDURE
[de-identified] : \par \par Trigger Point Tx\par After cleaning with alcohol, sterile needles were inserted into Ub 25 to 26 bilaterally,  Du 14, and right trapezius, rhomboid and manipulated intermittently followed by injection of Lidocaine. The needles were then removed. Hemostasis was achieved immediately. He  tolerated the procedure well and was given discharge instructions and then discharged to home without any complaints or complications.\par \par

## 2020-10-15 NOTE — HISTORY OF PRESENT ILLNESS
[FreeTextEntry1] : Location: back\par Quality: tightness\par Severity: mild\par Duration: over 1 yr\par Timing: chronic, hx LBP for years\par Context: lifting bed; dad has leukemia\par Aggravating Factors: lifting, bending\par Alleviating Factors: rest, TP tx\par Associated Symptoms: denies weight loss, fever, chills, change in bowel/bladder habits, redness, warmth, weakness, numbness/tingling, +radiation down legs sometimes\par Prior Studies: MRI 2020 \par \par Location: neck\par Quality: sharp\par Severity: 5/10, improving\par Duration: over 1 yr\par Timing: chronic\par Context: working out; lifting weight from rack \par Aggravating Factors: lifting weights\par Alleviating Factors: rest, TP tx\par Associated Symptoms: denies weight loss, fever, chills, change in bowel/bladder habits, redness, warmth, weakness, +numbness/tingling in arm improved, radiation down arm\par Prior Studies: MRI May 2019\par

## 2020-10-21 ENCOUNTER — APPOINTMENT (OUTPATIENT)
Dept: PHYSICAL MEDICINE AND REHAB | Facility: CLINIC | Age: 40
End: 2020-10-21

## 2021-01-22 ENCOUNTER — APPOINTMENT (OUTPATIENT)
Dept: PHYSICAL MEDICINE AND REHAB | Facility: CLINIC | Age: 41
End: 2021-01-22
Payer: COMMERCIAL

## 2021-01-22 PROCEDURE — 99072 ADDL SUPL MATRL&STAF TM PHE: CPT

## 2021-01-22 PROCEDURE — 20552 NJX 1/MLT TRIGGER POINT 1/2: CPT

## 2021-01-22 PROCEDURE — 99214 OFFICE O/P EST MOD 30 MIN: CPT | Mod: 25

## 2021-01-22 RX ORDER — DICLOFENAC SODIUM 20 MG/G
2 SOLUTION TOPICAL
Qty: 1 | Refills: 0 | Status: ACTIVE | COMMUNITY
Start: 2021-01-22 | End: 1900-01-01

## 2021-01-22 RX ORDER — DICLOFENAC SODIUM 20 MG/G
2 SOLUTION TOPICAL
Qty: 1 | Refills: 0 | Status: DISCONTINUED | COMMUNITY
Start: 2020-09-02 | End: 2021-01-22

## 2021-01-22 NOTE — ASSESSMENT
[FreeTextEntry1] : Keep back flat when biking.  Work on single leg squat.  Keep doing hip abd.  \par \par Pennsaid helps. \par \par f/u prn

## 2021-01-22 NOTE — HISTORY OF PRESENT ILLNESS
[FreeTextEntry1] : Location: back\par Quality: tightness\par Severity: mild\par Duration: over 1 yr\par Timing: chronic, hx LBP for years\par Context: lifting bed; dad has leukemia\par Aggravating Factors: lifting, bending\par Alleviating Factors: rest, TP tx\par Associated Symptoms: denies weight loss, fever, chills, change in bowel/bladder habits, redness, warmth, weakness, numbness/tingling, +radiation down legs sometimes\par Prior Studies: MRI 2020 \par \par Left knee is much better.  Exercises help.  Pain with running sometimes. No swelling, redness, warmth.

## 2021-01-22 NOTE — PROCEDURE
[de-identified] : \par Trigger Point Tx\par After cleaning with alcohol, sterile needles were inserted into Ub 25 to 26, rhomboids, and upper lumbar paraspinals bilaterally and manipulated intermittently followed by injection of Lidocaine. The needles were then removed. Hemostasis was achieved immediately. He  tolerated the procedure well and was given discharge instructions and then discharged to home without any complaints or complications.\par \par

## 2021-01-22 NOTE — PHYSICAL EXAM
[FreeTextEntry1] : PERAL is a 40 year yr old male \par \par Constitutional: healthy appearing, NAD, and normal body habitus\par \par LUMBAR\par ROM: flexion to 30 deg, ext normal\par \par Gait: normal\par \par Inspection: no erythema, warmth\par Spine: no TTP in spinous process, SI joint, sacrum\par \par Soft tissue palpation hip: no TTP in gluteus kasandra\par Soft tissue palpation of spine: no TTP in lumbar paraspinals\par \par 5/5 bilateral HF, KE, DF, PF \par sensation intact in bilat LE\par

## 2021-01-29 ENCOUNTER — APPOINTMENT (OUTPATIENT)
Dept: PHYSICAL MEDICINE AND REHAB | Facility: CLINIC | Age: 41
End: 2021-01-29
Payer: COMMERCIAL

## 2021-01-29 PROCEDURE — 99213 OFFICE O/P EST LOW 20 MIN: CPT | Mod: 25

## 2021-01-29 PROCEDURE — 99072 ADDL SUPL MATRL&STAF TM PHE: CPT

## 2021-01-29 PROCEDURE — 20552 NJX 1/MLT TRIGGER POINT 1/2: CPT

## 2021-01-29 NOTE — ASSESSMENT
[FreeTextEntry1] : Not needing pennsaid much.\par \par Exercising better than normal lately.  \par \par May need CTS splints if continues to have numbness often in hands at night.

## 2021-01-29 NOTE — HISTORY OF PRESENT ILLNESS
[FreeTextEntry1] : Location: back\par Quality: tightness\par Severity: 6/10\par Duration: over 1 yr\par Timing: chronic, hx LBP for years\par Context: lifting bed; dad has leukemia\par Aggravating Factors: lifting, bending\par Alleviating Factors: rest, TP tx\par Associated Symptoms: denies weight loss, fever, chills, change in bowel/bladder habits, redness, warmth, weakness, numbness/tingling, +radiation down legs sometimes\par Prior Studies: MRI 2020 \par \par Neck is much better.  No pain down arms.  Some numbness at night in hands.

## 2021-01-29 NOTE — PHYSICAL EXAM
[FreeTextEntry1] : PERLA is a 40 year yr old male \par \par Constitutional: healthy appearing, NAD, and normal body habitus\par \par LUMBAR\par ROM:  ext normal\par \par Gait: normal\par \par Inspection: no erythema, warmth\par Spine: no TTP in spinous process\par \par Soft tissue palpation hip: no TTP in gluteus kasandra\par Soft tissue palpation of spine: no TTP in lumbar paraspinals\par \par sensation intact in bilat LE

## 2021-01-29 NOTE — PROCEDURE
[de-identified] : \par Trigger Point Tx\par After cleaning with alcohol, sterile needles were inserted into Ub 24 to 26, and rhomboids bilaterally and manipulated intermittently followed by injection of Lidocaine. The needles were then removed. Hemostasis was achieved immediately. He  tolerated the procedure well and was given discharge instructions and then discharged to home without any complaints or complications.\par \par

## 2021-02-03 ENCOUNTER — APPOINTMENT (OUTPATIENT)
Dept: PHYSICAL MEDICINE AND REHAB | Facility: CLINIC | Age: 41
End: 2021-02-03
Payer: COMMERCIAL

## 2021-02-03 PROCEDURE — 20552 NJX 1/MLT TRIGGER POINT 1/2: CPT

## 2021-02-03 PROCEDURE — 99072 ADDL SUPL MATRL&STAF TM PHE: CPT

## 2021-02-03 PROCEDURE — 99213 OFFICE O/P EST LOW 20 MIN: CPT | Mod: 25

## 2021-02-04 NOTE — HISTORY OF PRESENT ILLNESS
[FreeTextEntry1] : Location: back\par Quality: tightness\par Severity: moderate, improving\par Duration: over 1 yr\par Timing: chronic, hx LBP for years\par Context: lifting bed; dad has leukemia\par Aggravating Factors: lifting, bending\par Alleviating Factors: rest, TP tx\par Associated Symptoms: denies weight loss, fever, chills, change in bowel/bladder habits, redness, warmth, weakness, numbness/tingling, +radiation down legs sometimes\par Prior Studies: MRI 2020 \par \par Neck is much better. No pain down arms. Some numbness at night in hands. \par

## 2021-02-04 NOTE — PROCEDURE
[de-identified] : \par Trigger Point Tx\par After cleaning with alcohol, sterile needles were inserted into Ub 24 to 26, and rhomboids bilaterally and manipulated intermittently followed by injection of Lidocaine. The needles were then removed. Hemostasis was achieved immediately. He  tolerated the procedure well and was given discharge instructions and then discharged to home without any complaints or complications.\par \par

## 2021-02-04 NOTE — ASSESSMENT
[FreeTextEntry1] : Try ma roller.  Do not use foam roller for back. \par \par Try CTS splints at night.  \par \par Has not needed to use much medications.

## 2021-02-10 ENCOUNTER — APPOINTMENT (OUTPATIENT)
Dept: PHYSICAL MEDICINE AND REHAB | Facility: CLINIC | Age: 41
End: 2021-02-10
Payer: COMMERCIAL

## 2021-02-10 VITALS — TEMPERATURE: 92.3 F

## 2021-02-10 DIAGNOSIS — M25.862 OTHER SPECIFIED JOINT DISORDERS, LEFT KNEE: ICD-10-CM

## 2021-02-10 PROCEDURE — 99072 ADDL SUPL MATRL&STAF TM PHE: CPT

## 2021-02-10 PROCEDURE — 99213 OFFICE O/P EST LOW 20 MIN: CPT | Mod: 25

## 2021-02-10 PROCEDURE — 20552 NJX 1/MLT TRIGGER POINT 1/2: CPT

## 2021-02-10 NOTE — PHYSICAL EXAM
[FreeTextEntry1] : PERLA is a 40 year yr old male \par \par Constitutional: healthy appearing, NAD, and normal body habitus\par \par LUMBAR\par ROM: ext normal\par \par Gait: normal\par \par Inspection: no erythema, warmth\par Spine: no TTP in spinous process\par \par Soft tissue palpation hip: no TTP in gluteus kasandra\par Soft tissue palpation of spine: no TTP in lumbar paraspinals\par \par \par 5/5 bilat UE\par neg Tinel at right elbow\par sensation intact in bilat UE

## 2021-02-10 NOTE — PROCEDURE
[de-identified] : Trigger Point Tx\par After cleaning with alcohol, sterile needles were inserted into Ub 24 to 26, rhomboids bilaterally and manipulated intermittently followed by injection of Lidocaine. The needles were then removed. Hemostasis was achieved immediately. He  tolerated the procedure well and was given discharge instructions and then discharged to home without any complaints or complications.\par \par

## 2021-02-10 NOTE — ASSESSMENT
[FreeTextEntry1] : Consider bar ends for bike to get better ergonomics and to alternate hand positions.\par \par Do not do extreme quad stretch he learned from brother in law.  Taught other stretches.  Start with heel to butt then progress to lunge stretch.  \par \par Corrected hip abd again.  He ordered ma roller. \par \par Get bands to do reverse flies for now since not going to gym.

## 2021-02-10 NOTE — HISTORY OF PRESENT ILLNESS
[FreeTextEntry1] : Location: back\par Quality: tightness\par Severity: mild\par Duration: over 1 yr\par Timing: chronic, hx LBP for years\par Context: lifting bed; dad has leukemia\par Aggravating Factors: lifting, bending\par Alleviating Factors: rest, TP tx\par Associated Symptoms: denies weight loss, fever, chills, change in bowel/bladder habits, redness, warmth, weakness, numbness/tingling, +radiation down legs sometimes\par Prior Studies: MRI 2020 \par \par Left knee is better. Exercises help.  Pain with running sometimes.  He can run around 3 miles.  No swelling, redness, warmth. \par \par Right arm has weird ache in it lately.  There is no pattern to it.  No neck pain.  Denies numbness.

## 2021-02-17 ENCOUNTER — APPOINTMENT (OUTPATIENT)
Dept: PHYSICAL MEDICINE AND REHAB | Facility: CLINIC | Age: 41
End: 2021-02-17
Payer: COMMERCIAL

## 2021-02-17 DIAGNOSIS — M50.20 OTHER CERVICAL DISC DISPLACEMENT, UNSPECIFIED CERVICAL REGION: ICD-10-CM

## 2021-02-17 DIAGNOSIS — M79.18 MYALGIA, OTHER SITE: ICD-10-CM

## 2021-02-17 DIAGNOSIS — M51.26 OTHER INTERVERTEBRAL DISC DISPLACEMENT, LUMBAR REGION: ICD-10-CM

## 2021-02-17 DIAGNOSIS — M67.40 GANGLION, UNSPECIFIED SITE: ICD-10-CM

## 2021-02-17 DIAGNOSIS — G56.03 CARPAL TUNNEL SYNDROM,BILATERAL UPPER LIMBS: ICD-10-CM

## 2021-02-17 PROCEDURE — 99214 OFFICE O/P EST MOD 30 MIN: CPT | Mod: 25

## 2021-02-17 PROCEDURE — 99072 ADDL SUPL MATRL&STAF TM PHE: CPT

## 2021-02-17 NOTE — ASSESSMENT
[FreeTextEntry1] : Hands numbness improving.  If it bothers him, get CTS splints.  \par \par Likely has small ganglion cysts.  Fix ergonomics of bike handle and get bar ends.  If not better can see Dr Lomeli.  Discussed injections.\par \par Had long discussion about how to do lunges properly showing different images of right and wrong ways to do it and had him demonstrate it a few times.\par \par Needs to on knee stability.  \par \par He got ma roller.  Do not use it on floor.  Do it in bed and do not roll as much.  Let it sink in slowly.

## 2021-02-17 NOTE — HISTORY OF PRESENT ILLNESS
[FreeTextEntry1] : Location: back\par Quality: tightness\par Severity: mild\par Duration: over 1 yr\par Timing: chronic, hx LBP for years\par Context: lifting bed; dad has leukemia\par Aggravating Factors: lifting, bending\par Alleviating Factors: rest, TP tx\par Associated Symptoms: denies weight loss, fever, chills, change in bowel/bladder habits, redness, warmth, weakness, numbness/tingling, +radiation down legs sometimes\par Prior Studies: MRI 2020 \par \par Knees acting up a times.  No pattern to it.  Exercises help. Pain with jump roping yesterday. He can run around 3 miles. No swelling, redness, warmth.

## 2021-02-17 NOTE — PHYSICAL EXAM
[FreeTextEntry1] : PERLA is a 41 year yr old male \par \par Constitutional: healthy appearing, NAD, and normal body habitus\par \par LUMBAR\par ROM: ext normal\par \par Gait: normal\par \par Inspection: no erythema, warmth\par Spine: no TTP in spinous process\par \par Soft tissue palpation hip: no TTP in gluteus kasandra\par Soft tissue palpation of spine: no TTP in lumbar paraspinals\par \par \par poor single leg squat, right worse than left\par \par small hard nodule in dorsal wrists\par no swelling, erythema, warmth

## 2021-02-17 NOTE — PROCEDURE
[de-identified] : \par Trigger Point Tx\par After cleaning with alcohol, sterile needles were inserted into Ub 25 to 26, and rhomboids bilaterally and manipulated intermittently followed by injection of Lidocaine. The needles were then removed. Hemostasis was achieved immediately. He  tolerated the procedure well and was given discharge instructions and then discharged to home without any complaints or complications.\par \par